# Patient Record
Sex: MALE | Race: OTHER | HISPANIC OR LATINO | Employment: FULL TIME | URBAN - METROPOLITAN AREA
[De-identification: names, ages, dates, MRNs, and addresses within clinical notes are randomized per-mention and may not be internally consistent; named-entity substitution may affect disease eponyms.]

---

## 2018-12-24 ENCOUNTER — ANESTHESIA EVENT (OUTPATIENT)
Dept: PERIOP | Facility: HOSPITAL | Age: 33
End: 2018-12-24
Payer: OTHER MISCELLANEOUS

## 2018-12-24 RX ORDER — SODIUM CHLORIDE 9 MG/ML
125 INJECTION, SOLUTION INTRAVENOUS CONTINUOUS
Status: CANCELLED | OUTPATIENT
Start: 2019-01-10

## 2018-12-26 ENCOUNTER — APPOINTMENT (OUTPATIENT)
Dept: LAB | Facility: HOSPITAL | Age: 33
End: 2018-12-26
Attending: ORTHOPAEDIC SURGERY
Payer: OTHER MISCELLANEOUS

## 2018-12-26 ENCOUNTER — TRANSCRIBE ORDERS (OUTPATIENT)
Dept: ADMINISTRATIVE | Facility: HOSPITAL | Age: 33
End: 2018-12-26

## 2018-12-26 ENCOUNTER — APPOINTMENT (OUTPATIENT)
Dept: PREADMISSION TESTING | Facility: HOSPITAL | Age: 33
End: 2018-12-26
Payer: OTHER MISCELLANEOUS

## 2018-12-26 DIAGNOSIS — M48.062 PSEUDOCLAUDICATION SYNDROME: ICD-10-CM

## 2018-12-26 DIAGNOSIS — M48.062 PSEUDOCLAUDICATION SYNDROME: Primary | ICD-10-CM

## 2018-12-26 LAB
ALBUMIN SERPL BCP-MCNC: 3.6 G/DL (ref 3.5–5)
ALP SERPL-CCNC: 109 U/L (ref 46–116)
ALT SERPL W P-5'-P-CCNC: 52 U/L (ref 12–78)
ANION GAP SERPL CALCULATED.3IONS-SCNC: 10 MMOL/L (ref 4–13)
AST SERPL W P-5'-P-CCNC: 24 U/L (ref 5–45)
BACTERIA UR QL AUTO: ABNORMAL /HPF
BASOPHILS # BLD AUTO: 0.06 THOUSANDS/ΜL (ref 0–0.1)
BASOPHILS NFR BLD AUTO: 1 % (ref 0–1)
BILIRUB SERPL-MCNC: 0.43 MG/DL (ref 0.2–1)
BILIRUB UR QL STRIP: NEGATIVE
BUN SERPL-MCNC: 18 MG/DL (ref 5–25)
CALCIUM SERPL-MCNC: 8.8 MG/DL (ref 8.3–10.1)
CHLORIDE SERPL-SCNC: 103 MMOL/L (ref 100–108)
CLARITY UR: ABNORMAL
CO2 SERPL-SCNC: 25 MMOL/L (ref 21–32)
COLOR UR: YELLOW
CREAT SERPL-MCNC: 1.1 MG/DL (ref 0.6–1.3)
EOSINOPHIL # BLD AUTO: 0.36 THOUSAND/ΜL (ref 0–0.61)
EOSINOPHIL NFR BLD AUTO: 4 % (ref 0–6)
ERYTHROCYTE [DISTWIDTH] IN BLOOD BY AUTOMATED COUNT: 12.3 % (ref 11.6–15.1)
GFR SERPL CREATININE-BSD FRML MDRD: 87 ML/MIN/1.73SQ M
GLUCOSE SERPL-MCNC: 102 MG/DL (ref 65–140)
GLUCOSE UR STRIP-MCNC: NEGATIVE MG/DL
HCT VFR BLD AUTO: 48.3 % (ref 36.5–49.3)
HGB BLD-MCNC: 16.1 G/DL (ref 12–17)
HGB UR QL STRIP.AUTO: ABNORMAL
IMM GRANULOCYTES # BLD AUTO: 0.03 THOUSAND/UL (ref 0–0.2)
IMM GRANULOCYTES NFR BLD AUTO: 0 % (ref 0–2)
KETONES UR STRIP-MCNC: NEGATIVE MG/DL
LEUKOCYTE ESTERASE UR QL STRIP: NEGATIVE
LYMPHOCYTES # BLD AUTO: 3.39 THOUSANDS/ΜL (ref 0.6–4.47)
LYMPHOCYTES NFR BLD AUTO: 38 % (ref 14–44)
MCH RBC QN AUTO: 29.8 PG (ref 26.8–34.3)
MCHC RBC AUTO-ENTMCNC: 33.3 G/DL (ref 31.4–37.4)
MCV RBC AUTO: 89 FL (ref 82–98)
MONOCYTES # BLD AUTO: 0.6 THOUSAND/ΜL (ref 0.17–1.22)
MONOCYTES NFR BLD AUTO: 7 % (ref 4–12)
NEUTROPHILS # BLD AUTO: 4.4 THOUSANDS/ΜL (ref 1.85–7.62)
NEUTS SEG NFR BLD AUTO: 50 % (ref 43–75)
NITRITE UR QL STRIP: NEGATIVE
NON-SQ EPI CELLS URNS QL MICRO: ABNORMAL /HPF
NRBC BLD AUTO-RTO: 0 /100 WBCS
PH UR STRIP.AUTO: 6.5 [PH] (ref 4.5–8)
PLATELET # BLD AUTO: 275 THOUSANDS/UL (ref 149–390)
PMV BLD AUTO: 10.3 FL (ref 8.9–12.7)
POTASSIUM SERPL-SCNC: 3.9 MMOL/L (ref 3.5–5.3)
PROT SERPL-MCNC: 7.7 G/DL (ref 6.4–8.2)
PROT UR STRIP-MCNC: NEGATIVE MG/DL
RBC # BLD AUTO: 5.4 MILLION/UL (ref 3.88–5.62)
RBC #/AREA URNS AUTO: ABNORMAL /HPF
SODIUM SERPL-SCNC: 138 MMOL/L (ref 136–145)
SP GR UR STRIP.AUTO: >=1.03 (ref 1–1.03)
UROBILINOGEN UR QL STRIP.AUTO: 0.2 E.U./DL
WBC # BLD AUTO: 8.84 THOUSAND/UL (ref 4.31–10.16)
WBC #/AREA URNS AUTO: ABNORMAL /HPF

## 2018-12-26 PROCEDURE — 80053 COMPREHEN METABOLIC PANEL: CPT

## 2018-12-26 PROCEDURE — 86803 HEPATITIS C AB TEST: CPT

## 2018-12-26 PROCEDURE — 85025 COMPLETE CBC W/AUTO DIFF WBC: CPT

## 2018-12-26 PROCEDURE — 81001 URINALYSIS AUTO W/SCOPE: CPT | Performed by: ORTHOPAEDIC SURGERY

## 2018-12-26 PROCEDURE — 87081 CULTURE SCREEN ONLY: CPT

## 2018-12-26 PROCEDURE — 36415 COLL VENOUS BLD VENIPUNCTURE: CPT

## 2018-12-26 RX ORDER — MELOXICAM 7.5 MG/1
TABLET ORAL
COMMUNITY

## 2018-12-26 NOTE — PRE-PROCEDURE INSTRUCTIONS
Pre-Surgery Instructions:   Medication Instructions    meloxicam (MOBIC) 7 5 mg tablet Patient was instructed by Physician and understands  Seen by Dr Daniel Pastor  Instructed has no medications to be taken morning of surgery  Stop NSAIDs and aspirin 7 days before surgery  Instructed re chlorhexidine showers per hospital policy

## 2018-12-26 NOTE — ANESTHESIA PREPROCEDURE EVALUATION
Review of Systems/Medical History  Patient summary reviewed  Chart reviewed  No history of anesthetic complications     Cardiovascular  Negative cardio ROS    Pulmonary  Negative pulmonary ROS        GI/Hepatic  Negative GI/hepatic ROS          Negative  ROS        Endo/Other    Obesity    GYN  Negative gynecology ROS          Hematology   Musculoskeletal  Negative musculoskeletal ROS Back pain , lumbar pain,        Neurology  Negative neurology ROS      Psychology   Negative psychology ROS              Physical Exam    Airway    Mallampati score: II  TM Distance: >3 FB  Neck ROM: full     Dental   No notable dental hx     Cardiovascular  Comment: Negative ROS, Rhythm: regular, Rate: normal, Cardiovascular exam normal    Pulmonary  Pulmonary exam normal Breath sounds clear to auscultation,     Other Findings        Anesthesia Plan  ASA Score- 2     Anesthesia Type- general with ASA Monitors  Additional Monitors:   Airway Plan: ETT  Plan Factors-Patient not instructed to abstain from smoking on day of procedure  Patient did not smoke on day of surgery  Induction- intravenous  Postoperative Plan-     Informed Consent- Anesthetic plan and risks discussed with patient

## 2018-12-27 LAB
HCV AB SER QL: NORMAL
MRSA NOSE QL CULT: NORMAL

## 2019-01-10 ENCOUNTER — ANESTHESIA (OUTPATIENT)
Dept: PERIOP | Facility: HOSPITAL | Age: 34
End: 2019-01-10
Payer: OTHER MISCELLANEOUS

## 2019-01-10 ENCOUNTER — HOSPITAL ENCOUNTER (OUTPATIENT)
Facility: HOSPITAL | Age: 34
Setting detail: OUTPATIENT SURGERY
Discharge: HOME | End: 2019-01-10
Attending: ORTHOPAEDIC SURGERY | Admitting: ORTHOPAEDIC SURGERY
Payer: OTHER MISCELLANEOUS

## 2019-01-10 ENCOUNTER — HOSPITAL ENCOUNTER (OUTPATIENT)
Dept: RADIOLOGY | Facility: HOSPITAL | Age: 34
Setting detail: OUTPATIENT SURGERY
Discharge: HOME | End: 2019-01-10
Payer: OTHER MISCELLANEOUS

## 2019-01-10 VITALS
WEIGHT: 210 LBS | HEART RATE: 80 BPM | RESPIRATION RATE: 18 BRPM | DIASTOLIC BLOOD PRESSURE: 72 MMHG | OXYGEN SATURATION: 96 % | SYSTOLIC BLOOD PRESSURE: 119 MMHG | HEIGHT: 66 IN | TEMPERATURE: 98.1 F | BODY MASS INDEX: 33.75 KG/M2

## 2019-01-10 DIAGNOSIS — M48.062 SPINAL STENOSIS OF LUMBAR REGION WITH NEUROGENIC CLAUDICATION: ICD-10-CM

## 2019-01-10 DIAGNOSIS — M51.26 HERNIATION OF NUCLEUS PULPOSUS, LUMBAR: Primary | ICD-10-CM

## 2019-01-10 DIAGNOSIS — M48.061 SPINAL STENOSIS OF LUMBAR REGION, UNSPECIFIED WHETHER NEUROGENIC CLAUDICATION PRESENT: ICD-10-CM

## 2019-01-10 LAB
ABO GROUP BLD: NORMAL
BLD GP AB SCN SERPL QL: NEGATIVE
GLUCOSE SERPL-MCNC: 91 MG/DL (ref 65–140)
RH BLD: POSITIVE
SPECIMEN EXPIRATION DATE: NORMAL

## 2019-01-10 PROCEDURE — 86850 RBC ANTIBODY SCREEN: CPT | Performed by: PHYSICIAN ASSISTANT

## 2019-01-10 PROCEDURE — 86901 BLOOD TYPING SEROLOGIC RH(D): CPT | Performed by: PHYSICIAN ASSISTANT

## 2019-01-10 PROCEDURE — 86900 BLOOD TYPING SEROLOGIC ABO: CPT | Performed by: PHYSICIAN ASSISTANT

## 2019-01-10 PROCEDURE — 82948 REAGENT STRIP/BLOOD GLUCOSE: CPT

## 2019-01-10 PROCEDURE — 97530 THERAPEUTIC ACTIVITIES: CPT

## 2019-01-10 PROCEDURE — 97760 ORTHOTIC MGMT&TRAING 1ST ENC: CPT

## 2019-01-10 PROCEDURE — 72020 X-RAY EXAM OF SPINE 1 VIEW: CPT

## 2019-01-10 RX ORDER — OXYCODONE HYDROCHLORIDE AND ACETAMINOPHEN 5; 325 MG/1; MG/1
1 TABLET ORAL EVERY 4 HOURS PRN
Qty: 60 TABLET | Refills: 0 | Status: SHIPPED | OUTPATIENT
Start: 2019-01-10 | End: 2019-01-20

## 2019-01-10 RX ORDER — ONDANSETRON 2 MG/ML
INJECTION INTRAMUSCULAR; INTRAVENOUS AS NEEDED
Status: DISCONTINUED | OUTPATIENT
Start: 2019-01-10 | End: 2019-01-10 | Stop reason: SURG

## 2019-01-10 RX ORDER — NEOSTIGMINE METHYLSULFATE 1 MG/ML
INJECTION INTRAVENOUS AS NEEDED
Status: DISCONTINUED | OUTPATIENT
Start: 2019-01-10 | End: 2019-01-10 | Stop reason: SURG

## 2019-01-10 RX ORDER — DEXAMETHASONE SODIUM PHOSPHATE 4 MG/ML
INJECTION, SOLUTION INTRA-ARTICULAR; INTRALESIONAL; INTRAMUSCULAR; INTRAVENOUS; SOFT TISSUE AS NEEDED
Status: DISCONTINUED | OUTPATIENT
Start: 2019-01-10 | End: 2019-01-10 | Stop reason: SURG

## 2019-01-10 RX ORDER — GLYCOPYRROLATE 0.2 MG/ML
INJECTION INTRAMUSCULAR; INTRAVENOUS AS NEEDED
Status: DISCONTINUED | OUTPATIENT
Start: 2019-01-10 | End: 2019-01-10 | Stop reason: SURG

## 2019-01-10 RX ORDER — HYDROMORPHONE HCL/PF 1 MG/ML
0.5 SYRINGE (ML) INJECTION
Status: DISCONTINUED | OUTPATIENT
Start: 2019-01-10 | End: 2019-01-10 | Stop reason: HOSPADM

## 2019-01-10 RX ORDER — ROCURONIUM BROMIDE 10 MG/ML
INJECTION, SOLUTION INTRAVENOUS AS NEEDED
Status: DISCONTINUED | OUTPATIENT
Start: 2019-01-10 | End: 2019-01-10 | Stop reason: SURG

## 2019-01-10 RX ORDER — BUPIVACAINE HYDROCHLORIDE AND EPINEPHRINE 5; 5 MG/ML; UG/ML
INJECTION, SOLUTION EPIDURAL; INTRACAUDAL; PERINEURAL AS NEEDED
Status: DISCONTINUED | OUTPATIENT
Start: 2019-01-10 | End: 2019-01-10 | Stop reason: HOSPADM

## 2019-01-10 RX ORDER — METHOCARBAMOL 750 MG/1
750 TABLET, FILM COATED ORAL EVERY 6 HOURS PRN
Qty: 80 TABLET | Refills: 0 | Status: SHIPPED | OUTPATIENT
Start: 2019-01-10 | End: 2019-02-09

## 2019-01-10 RX ORDER — ESMOLOL HYDROCHLORIDE 10 MG/ML
INJECTION INTRAVENOUS AS NEEDED
Status: DISCONTINUED | OUTPATIENT
Start: 2019-01-10 | End: 2019-01-10 | Stop reason: SURG

## 2019-01-10 RX ORDER — HYDROMORPHONE HYDROCHLORIDE 2 MG/ML
INJECTION, SOLUTION INTRAMUSCULAR; INTRAVENOUS; SUBCUTANEOUS AS NEEDED
Status: DISCONTINUED | OUTPATIENT
Start: 2019-01-10 | End: 2019-01-10 | Stop reason: SURG

## 2019-01-10 RX ORDER — CEFAZOLIN SODIUM 2 G/50ML
2000 SOLUTION INTRAVENOUS
Status: DISCONTINUED | OUTPATIENT
Start: 2019-01-10 | End: 2019-01-10 | Stop reason: HOSPADM

## 2019-01-10 RX ORDER — FENTANYL CITRATE 50 UG/ML
INJECTION, SOLUTION INTRAMUSCULAR; INTRAVENOUS AS NEEDED
Status: DISCONTINUED | OUTPATIENT
Start: 2019-01-10 | End: 2019-01-10 | Stop reason: SURG

## 2019-01-10 RX ORDER — ACETAMINOPHEN 325 MG/1
975 TABLET ORAL
Status: DISCONTINUED | OUTPATIENT
Start: 2019-01-10 | End: 2019-01-10 | Stop reason: HOSPADM

## 2019-01-10 RX ORDER — PROPOFOL 10 MG/ML
INJECTION, EMULSION INTRAVENOUS AS NEEDED
Status: DISCONTINUED | OUTPATIENT
Start: 2019-01-10 | End: 2019-01-10 | Stop reason: SURG

## 2019-01-10 RX ORDER — HYDROCODONE BITARTRATE AND ACETAMINOPHEN 5; 325 MG/1; MG/1
2 TABLET ORAL EVERY 4 HOURS PRN
Status: DISCONTINUED | OUTPATIENT
Start: 2019-01-10 | End: 2019-01-10 | Stop reason: HOSPADM

## 2019-01-10 RX ORDER — KETOROLAC TROMETHAMINE 30 MG/ML
INJECTION, SOLUTION INTRAMUSCULAR; INTRAVENOUS AS NEEDED
Status: DISCONTINUED | OUTPATIENT
Start: 2019-01-10 | End: 2019-01-10 | Stop reason: SURG

## 2019-01-10 RX ORDER — ONDANSETRON 2 MG/ML
4 INJECTION INTRAMUSCULAR; INTRAVENOUS ONCE AS NEEDED
Status: DISCONTINUED | OUTPATIENT
Start: 2019-01-10 | End: 2019-01-10 | Stop reason: HOSPADM

## 2019-01-10 RX ORDER — CELECOXIB 200 MG/1
200 CAPSULE ORAL
Status: DISCONTINUED | OUTPATIENT
Start: 2019-01-10 | End: 2019-01-10 | Stop reason: HOSPADM

## 2019-01-10 RX ORDER — SODIUM CHLORIDE 9 MG/ML
125 INJECTION, SOLUTION INTRAVENOUS CONTINUOUS
Status: DISCONTINUED | OUTPATIENT
Start: 2019-01-10 | End: 2019-01-10 | Stop reason: HOSPADM

## 2019-01-10 RX ORDER — SODIUM CHLORIDE, SODIUM LACTATE, POTASSIUM CHLORIDE, CALCIUM CHLORIDE 600; 310; 30; 20 MG/100ML; MG/100ML; MG/100ML; MG/100ML
100 INJECTION, SOLUTION INTRAVENOUS CONTINUOUS
Status: DISCONTINUED | OUTPATIENT
Start: 2019-01-10 | End: 2019-01-10 | Stop reason: HOSPADM

## 2019-01-10 RX ORDER — KETAMINE HYDROCHLORIDE 50 MG/ML
INJECTION, SOLUTION, CONCENTRATE INTRAMUSCULAR; INTRAVENOUS AS NEEDED
Status: DISCONTINUED | OUTPATIENT
Start: 2019-01-10 | End: 2019-01-10 | Stop reason: SURG

## 2019-01-10 RX ORDER — MIDAZOLAM HYDROCHLORIDE 1 MG/ML
INJECTION INTRAMUSCULAR; INTRAVENOUS AS NEEDED
Status: DISCONTINUED | OUTPATIENT
Start: 2019-01-10 | End: 2019-01-10 | Stop reason: SURG

## 2019-01-10 RX ADMIN — KETAMINE HYDROCHLORIDE 20 MG: 50 INJECTION INTRAMUSCULAR; INTRAVENOUS at 10:03

## 2019-01-10 RX ADMIN — ACETAMINOPHEN 975 MG: 325 TABLET, FILM COATED ORAL at 07:43

## 2019-01-10 RX ADMIN — CELECOXIB 200 MG: 200 CAPSULE ORAL at 07:44

## 2019-01-10 RX ADMIN — NEOSTIGMINE METHYLSULFATE 3 MG: 1 INJECTION INTRAVENOUS at 11:29

## 2019-01-10 RX ADMIN — FENTANYL CITRATE 50 MCG: 50 INJECTION, SOLUTION INTRAMUSCULAR; INTRAVENOUS at 10:25

## 2019-01-10 RX ADMIN — SODIUM CHLORIDE 125 ML/HR: 0.9 INJECTION, SOLUTION INTRAVENOUS at 08:01

## 2019-01-10 RX ADMIN — SODIUM CHLORIDE: 0.9 INJECTION, SOLUTION INTRAVENOUS at 10:25

## 2019-01-10 RX ADMIN — KETAMINE HYDROCHLORIDE 10 MG: 50 INJECTION INTRAMUSCULAR; INTRAVENOUS at 10:25

## 2019-01-10 RX ADMIN — FENTANYL CITRATE 100 MCG: 50 INJECTION, SOLUTION INTRAMUSCULAR; INTRAVENOUS at 09:57

## 2019-01-10 RX ADMIN — CEFAZOLIN SODIUM 2000 MG: 2 SOLUTION INTRAVENOUS at 10:03

## 2019-01-10 RX ADMIN — KETAMINE HYDROCHLORIDE 10 MG: 50 INJECTION INTRAMUSCULAR; INTRAVENOUS at 10:12

## 2019-01-10 RX ADMIN — ROCURONIUM BROMIDE 40 MG: 10 INJECTION INTRAVENOUS at 09:57

## 2019-01-10 RX ADMIN — GLYCOPYRROLATE 0.4 MG: 0.2 INJECTION, SOLUTION INTRAMUSCULAR; INTRAVENOUS at 11:29

## 2019-01-10 RX ADMIN — ONDANSETRON 4 MG: 2 INJECTION INTRAMUSCULAR; INTRAVENOUS at 10:03

## 2019-01-10 RX ADMIN — KETAMINE HYDROCHLORIDE 10 MG: 50 INJECTION INTRAMUSCULAR; INTRAVENOUS at 10:18

## 2019-01-10 RX ADMIN — ESMOLOL HYDROCHLORIDE 10 MG: 10 INJECTION, SOLUTION INTRAVENOUS at 10:05

## 2019-01-10 RX ADMIN — KETOROLAC TROMETHAMINE 30 MG: 30 INJECTION, SOLUTION INTRAMUSCULAR at 10:30

## 2019-01-10 RX ADMIN — MIDAZOLAM 2 MG: 1 INJECTION INTRAMUSCULAR; INTRAVENOUS at 09:50

## 2019-01-10 RX ADMIN — PROPOFOL 50 MG: 10 INJECTION, EMULSION INTRAVENOUS at 09:57

## 2019-01-10 RX ADMIN — DEXAMETHASONE SODIUM PHOSPHATE 8 MG: 4 INJECTION, SOLUTION INTRAMUSCULAR; INTRAVENOUS at 10:03

## 2019-01-10 RX ADMIN — HYDROMORPHONE HYDROCHLORIDE 0.5 MG: 2 INJECTION, SOLUTION INTRAMUSCULAR; INTRAVENOUS; SUBCUTANEOUS at 10:38

## 2019-01-10 RX ADMIN — FENTANYL CITRATE 50 MCG: 50 INJECTION, SOLUTION INTRAMUSCULAR; INTRAVENOUS at 10:11

## 2019-01-10 NOTE — PHYSICAL THERAPY NOTE
Orthotic Note            Date: 1/10/2019      Patient Name: Concetta Moreno            Reason for Consult:  LSO fitting & training      Recommendations:  Pt 37 y o male s/p L4/5, L5/S1 HEMILAMINOTOMY; MEDIAL FACETECTOMY AND FORAMINOTOMY 2* to spinal stenosis of lumbar region w/ neurogenic claudication  PT consulted for LSO fitting & training  Applied LSO while sitting at EOB  LSO adjusted for appropriate fit  Proper fit achieved  Pt & wife instructed on LSO application w/ good understanding & return demo  Pt also instructed on back precautions & safety w/ good understanding  Pt require minAx1 for bed mobility & transfers 2* to pain & dizziness  Unable to assess amb 2* to inc dizziness upon standing hence assisted back in bed  BP monitored by RN  No LOC noted  Will D/C PT  RN to further mobilized pt to assess mobility tolerance  RN notified   Efrain Ewing PT

## 2019-01-10 NOTE — DISCHARGE INSTRUCTIONS
Please see printed instructions from [address] los puntos de sutura absorbibles   LO QUE NECESITA SABER:   ¿Qué son los puntos de sutura absorbibles? Los puntos de sutura o suturas absorbibles se usan para cerrar incisiones o heridas  Estos puntos de sutura son absorbidos por el cuerpo, o se caen por sí solos en varios días o semanas  No necesitan ser removidos  ¿Cómo puedo cuidar mis puntos de sutura absorbibles y mi herida? · Proteja garduno herida contra joy lesión adicional   Use ropa protectora sobre la herida y protéjala chava solar  No ponga presión sobre garduno herida  Gobles podría [place]a [place] y aumentar el riesgo de joy infección  · Eleve el área de la herida  Mantenga la herida por encima del nivel del corazón tan a menudo mack pueda  Gobles va a disminuir inflamación y el dolor  Si es posible, apoye el área de garduno herida sobre almohadas o mantas para mantenerla elevada cómodamente  · Brenda Motts y [place] garduno herida con un vendaje  Lave cuidadosamente la herida con agua y [place]  Seque con cuidado el área y coloque vendas nuevas y limpias mack se le indique  Cambie linh vendajes cuando se mojen o ensucien  · Rosita joy ducha en vez de bañarse  Espere de 12 a 24 horas después de recibir linh puntos de sutura para bañarse  No tome lili de huyen ni nade  ¿Cuándo delfina buscar atención inmediata? · Garduno herida se abre  · Usted tiene líneas vasquez alrededor de garduno herida  · Usted tiene inflamación o dolor en los ganglios linfáticos  · La silvio empapa el vendaje  ¿Cuándo delfina comunicarme con mi médico?   · Usted tiene signos de Daryle Riggins infección, mack más enrojecimiento, dolor, inflamación o pus  · Usted tiene fiebre  · Linh puntos de sutura no se absorben cuando garduno médico le indicó que deberían Emmalena  · Usted tiene preguntas o inquietudes acerca de garduno condición o cuidado  ACUERDOS SOBRE GARDUNO CUIDADO:   Usted tiene el derecho de ayudar a planear garduno cuidado   Aprenda todo lo que pueda sobre patton condición y mack darle [address]  Discuta linh opciones de tratamiento con linh médicos para decidir el cuidado que usted desea recibir  Usted siempre tiene el derecho de rechazar el tratamiento  Esta información es sólo para uso en educación  Patton intención no es darle un consejo médico sobre enfermedades o tratamientos  Colsulte con patton [place] farmacéutico antes de seguir cualquier régimen médico para saber si es seguro y efectivo para usted  © 2017 [address] Information is for End User's use only and may not be sold, redistributed or otherwise used for commercial purposes  All illustrations and images included in CareNotes® are the copyrighted property of A D A M , Inc  or Marcos Sow  Laminectomía   LO QUE NECESITA SABER:   [place] laminectomía es [place] para extraer los lorelei óseos (lámina) de joy o más vértebras de patton columna  Esta cirugía puede ayudar a aliviar la presión [employer] médula wise o nervios  INSTRUCCIONES SOBRE EL ADIEL HOSPITALARIA:   Busque atención médica de inmediato o llame al 911 si:   · Patton brazo o pierna se siente caliente, sensible y adolorida  Se podría kelsea inflamado y lindo  · Usted se siente mareada, le hace falta el aire y tiene dolor de [place]  · Usted expectora silvio  · Usted no puede sentir ni  linh piernas  · Usted no puede controlar cuándo [place] o tiene joy evacuación intestinal     · Usted nota joy secreción tricia en patton piel o vendaje que sale de patton incisión  Pregúntele a patton Ardie Isma vitaminas y minerales son adecuados para usted  · Hay joy protuberancia en el área de patton incisión  · Usted tiene colleen de archie regulares  · Linh puntos de sutura o grapas están inflamados , rojos o [employer] pus  · Se [address] grapas  · La silvio empapa el vendaje  · Patton dolor empeora aún después de isa medicamento para el dolor      · Usted se siente débil, entumecido o tiene dolor en hernandez espalda, glúteos o piernas  · Usted tiene fiebre  · Usted tiene preguntas o inquietudes acerca de hernandez condición o cuidado  Medicamentos:   · Los analgésicos  podrían ser administrados  Pregunte cómo isa estos medicamentos de joy forma cornejo  · [place] sally medicamentos mack se le haya indicado  Consulte con hernandez médico si usted levon que hernandez medicamento no le está ayudando o si presenta efectos secundarios  Infórmele si es alérgico a cualquier medicamento  Mantenga joy lista actualizada de los [place], las vitaminas y los productos herbales que amaya  Incluya los siguientes datos de los medicamentos: cantidad, frecuencia y motivo de administración  Traiga con usted la lista o los envases de la píldoras a sally citas de seguimiento  Lleve la lista de los medicamentos con usted en joseph de joy emergencia  Acuda a sally consultas de control con hernandez médico según le indicaron  Anote sally preguntas para que se acuerde de hacerlas avery sally visitas  Fisioterapia:  Un fisioterapeuta le enseñará ejercicios para fortalecer los músculos de la espalda y aliviar el dolor  También, puede enseñarle técnicas para doblarse, pararse, sentarse y levantar objetos sin lastimarse  Actividad:  Es probable que [place] descansar más después de la [place]  Empiece a hacer un poco más día a día  Descanse cuando usted sienta que es necesario  Avery 1 a 2 semanas, limite las veces que sube y baja escaleras a joy amanda vez al día si es posible  Pregunte cuándo puede volver a sally [employer]  Pautas para el movimiento:   · No levante objetos pesados , mack alimentos para la despensa o cestas de ropa avery las primeras 2 semanas  No levante nada por encima de hernandez archie  · No doble las caderas  Doble las rodillas y mantenga la espalda recta al recoger cosas del suelo  · No tuerza la cintura  · Duerma en un colchón firme    Cuando usted se acuesta de espaldas, coloque 2 o 3 almohadas debajo de sally rodillas y la parte inferior de sally piernas para elevarlas  Al acostarse de lado, doble sally rodillas y use joy almohada pequeña por debajo de patton archie y beulah  [place] disminuirá la tensión en sally hombros, beulah y brazos  No  se acueste boca abajo  · No se siente por más de 15 a 20 minutos a la [employer] 3 primeras semanas después de la [facility]  Si[place]ntese en joy silla firme con la espalda derecha  Mantenga sally pies sobre un banco y sally rodillas dobladas y ligeramente más altas que sally caderas  Cuidado de la herida:  Lave cuidadosamente la herida con agua y [place]  Seque el área y póngale vendajes nuevos y limpios mack le indicaron  Cambie sally vendajes cuando se mojen o ensucien  Calor:  Aplique calor sobe patton herida de 20 a 30 minutos cada 2 horas por los días que se le indique  El calor ayuda a disminuir el dolor y los espasmos musculares  © 2017 [address] Information is for End User's use only and may not be sold, redistributed or otherwise used for commercial purposes  All illustrations and images included in CareNotes® are the copyrighted property of [employer]  or Marcos Sow  Esta información es sólo para uso en educación  Patton intención no es darle un consejo médico sobre enfermedades o tratamientos  Colsulte con patton Nitin Loida farmacéutico antes de seguir cualquier régimen médico para saber si es seguro y efectivo para usted  Metocarbamol (Por la boca)   Sirve para tratar el dolor y los espasmos musculares  Domingo(s) : Robaxin, Robaxin-750   Existen muchas otras marcas de Chanel  Howie medicamento no debe ser usado cuando:   No use howie medicamento si alguna vez sheriff tenido Suzanne Shillings reacción alérgica al [employer] o a medicamentos relacionados mack [place]® o [place]  Forma de usar howie medicamento:   Tableta  · El médico le dirá cuánto medicamento isa, y la frecuencia con que debe hacerlo    · No tome joy dosis mayor ni con [place] frecuencia que lo ordenado por hernandez médico   Si joy dosis es olvidada:   · [place] la dosis [place] lo más pronto posible, si no ha pasado más de joy hora del horario habitual  Si sheriff pasado más de [place], sáltese la dosis [place] y regrese a hernandez horario regular  · No use dos dosis al [facility] MIRAGE  Forma de guardar y botar howie medicamento:   · Guarde a temperatura ambiente, lejos del calor, la humedad y la dominga directa  · Guarde todos los medicamentos fuera del alcance de los niños  Medicamentos y [employer] que debe evitar:   Consulte con hernandez médico o farmacéutico antes de usar cualquier medicamento, incluyendo los que compra sin receta médica, las vitaminas y los productos herbales  · No abdi alcohol mientras use howie medicamento  · Informe al médico si usted está usando cualquier medicamento que pueda causarle sueño mack las píldoras para dormir, sedantes, antidepresivos, medicamentos para el resfriado, alergia o dolor  Precauciones earl el uso de howie medicamento:   · Informe a hernandez medico si esta embarazada o dando de lactar  · [facility] puede causar sueño o mareo  Tenga cuidado si maneja un automóvil o al [employer]  · [facility] puede produce que hernandez orina se torne [place], aditi o liu  [place] no es razón para preocuparse  Efectos secundarios que pueden presentarse earl el uso de howie medicamento:   Consulte inmediatamente con el médico si nota cualquiera de estos efectos secundarios:  · Dificultad para respirar  · Sarpullido a la piel, ronchas o picazón  · Dificultad para hablar con claridad o sueño severo  · Fiebre o escalofríos  Consulte con el médico si nota los siguientes efectos secundarios menos graves:   · Dolor de archie  · Sueño o Sharl Shirts  · Náuseas  · Pérdida del apetito o sabor metálico  · Nariz congestionada  Consulte con el médico si nota otros efectos secundarios que levon son causados por howie medicamento     Llame a hernandez médico para consultarle Marko Rosa secundarios  Usted puede notificar sally efectos secundarios al FDA al [phone]  © 2017 [address] Information is for End User's use only and may not be sold, redistributed or otherwise used for commercial purposes  Esta información es sólo para uso en educación  Patton intención no es darle un consejo médico sobre enfermedades o tratamientos  Colsulte con patton Na [place] farmacéutico antes de seguir cualquier régimen médico para saber si es seguro y efectivo para usted  Oxicodona/Acetaminofén (Por la boca)   Se [place] para tratar el dolor de moderado a moderadamente intenso  Howie medicamento es un narcótico para el dolor  Domingo(s) : Endocet, Percocet, Primlev, Xartemis XR   Existen muchas otras marcas de [employer]  Howie medicamento no debe ser usado cuando:   Howie medicamento no es adecuado para todas las personas  No lo use si usted alguna vez tuvo trini reacción alérgica al acetaminofeno o la oxicodona, o si tiene problemas respiratorios graves o [address] usar howie medicamento:   Caitlin Galeana Muckle de liberación prolongada  · Patton médico le indicara cuanto medicamento necesita usar  No use más medicamento de lo indicado  · Trini sobredosis puede ser Kyle García  Siga las instrucciones con cuidado para no isa [facility] cantidad del medicamento de trini amanda vez  · Solución oral: Mida el líquido oral con leanna Suazo para uso oral o taza especialmente marcadas para medir medicamentos  · [place] la tableta de liberación prolongada entera  No triture, rompa o mastique  No chupe o moje la tableta antes de colocarla en patton boca  No administre howie medicamento a través de un tubo alimenticio  · [employer] debe venir con Gabriella Normanía del medicamento  [place] trini copia con patton farmacéutico en joseph de no tener la guía    · Si olvida trini dosis:  Si se le olvida isa rtini dosis de [employer], omita la dosis [place] y vuelva al horario regular de dosificación  No duplique la dosis  · Guarde el medicamento en un recipiente cerrado a temperatura ambiente y alejado del calor, la humedad y la dominga directa  Consulte con hernandez farmacéutico sobre la mejor forma de botar el medicamento que no haya usado  Medicamentos y [employer] que debe evitar:   Consulte con hernandez médico o farmacéutico antes de usar cualquier medicamento, incluyendo los que compra sin receta médica, las vitaminas y los productos herbales  · No utilice Xartemis XR si usted [place] un inhibidor de la monoaminooxidasa (IMAO) o si lo ha [employer] últimos 14 días  · Algunos medicamentos pueden afectar la función de Inspro  Informe a hernandez médico si está usando cualquiera de los siguientes:   ¨ [place], eritromicina, ketoconazol, lamotrigina, mirtazapina, naltrexona, fenitoína, propranolol, rifampina, ritonavir, tramadol, trazodona o zidovudina  ¨ Las pastillas anticonceptivas  ¨ Diurético  ¨ Medicamento para tratar la depresión  ¨ Medicamentos con fenotiazina  ¨ Medicamento de triptán para tratar la migraña  · No consuma alcohol mientras esté   El acetaminofeno puede dañar hernandez hígado y el alcohol puede aumentar howie riesgo  Si usted consume 3 bebidas alcohólicas o más cada día, no tome acetaminofén sin consultar a hernandez médico   · Informe a hernandez médico si usted [place] cualquier cosa que le provoca sueño  G[place]alo [place] son medicamentos para alergia o medicamentos narcóticos para el dolor y el alcohol  Informe a hernandez médico si usted está usando buprenorfina, butorfanol, nalbufina, pentazocina, joy benzodiazepina o un relajante muscular    Precauciones earl el uso de howie medicamento:   · Informe a hernandez médico si usted está embarazada o amamantando, o si tiene enfermedad renal, enfermedad hepática, enfermedad cardíaca, presión arterial baja, problemas pulmonares o respiratorios (mack asma, EPOC), problemas de tiroides, enfermedad de [place], problemas en el páncreas o la vesícula, problemas prostáticos, problemas para orinar o trastornos estomacales, o antecedentes de lesión en la archie o daño cerebral, convulsiones o adicción a las drogas o el alcohol  Infórmele al médico si usted es alérgico a la codeína  · Jenae medicamento puede causar los siguientes problemas:  ¨ Mayor riesgo de sobredosis, que puede conducir a la muerte  ¨ La depresión respiratoria (problema respiratorio grave que puede ser mortal)  ¨ Problemas hepáticos  ¨ Reacciones graves en la piel  ¨ Síndrome de la serotonina (cuando se [place] con otros medicamentos)  · Jenae medicamento podría causarle a usted mareos o somnolencia  No maneje ni nestor otra tarea que pueda ser peligrosa hasta que sepa cómo le afecta jenae medicamento  Siéntese o acuéstese si se siente mareado  Póngase de pie con cuidado  · Jenae medicamento contiene acetaminofén  Gretel las etiquetas de todos los demás medicamentos que esté usando para saber si también contienen acetaminofén, o pregunte a hernandez médico o farmacéutico  No use más de 4 gramos (4000 miligramos) en total de acetaminofeno en un día  · Jenae medicamento puede convertirse en un hábito  No use más de la dosis prescrita  Llame a hernandez médico si usted siente que el medicamento no le está funcionando  · No suspenda el uso de jenae medicamento súbitamente  Hernandez médico necesitará disminuir hernandez dosis poco a poco antes de suspender el medicamento por completo  · Puede que jenae medicamento cause infertilidad  Hable con hernandez médico antes de usar jenae medicamento si usted planea tener hijos  · Jenae medicamento puede causar el estreñimiente, especialmente si usted lo [place] earl [employer]  Pregúntele a hernandez médico si usted también debería usar un laxante para prevenir y tratar el estreñimiento  · Guarde todos los medicamentos fuera del alcance de los niños  Nunca comparta sally medicamentos con [employer]    Efectos secundarios que pueden presentarse earl el uso de jenae medicamento:   Consulte inmediatamente con el médico si nota cualquiera de estos efectos secundarios:  · Reacción alérgica: Comezón o ronchas, hinchazón del vasquez o las shirley, hinchazón u hormigueo en la boca o garganta, opresión en el pecho, dificultad para respirar  · Ansiedad, inquietud, ritmo cardíaco rápido, fiebre, espasmos musculares, contracciones involuntarias, diarrea, kelsea o escuchar cosas que no existen  · Ampollas, despellejamiento, sarpullido lindo en la piel  · Piel, labios o uñas azuladas  · [place] oscura o heces pálidas, pérdida de apetito, dolor de BJURHOLM, piel u ojos amarillos  · Debilidad excesiva, respiración superficial, ritmo cardíaco irregular, convulsiones, transpiración, o piel húmeda o fría  · Confusión grave, desvanecimientos, mareos o desmayos  · Estreñimiento grave, náuseas o vómitos  · Dificultad para respirar o respiración lenta  Consulte con el médico si nota los siguientes efectos secundarios menos graves:   · Dolor de archie  · Estreñimiento leve, náuseas o vómitos  · Sueño o somnolencia leves  Consulte con el médico si nota otros efectos secundarios que levon son causados por howie medicamento  Llame a patton médico para consultarle Sol Gardner puede notificar sally efectos secundarios al FDA al [phone]  © 2017 [address] Information is for End User's use only and may not be sold, redistributed or otherwise used for commercial purposes  Esta información es sólo para uso en educación  Patton intención no es darle un consejo médico sobre enfermedades o tratamientos  Colsulte con patton Sandra Lauri farmacéutico antes de seguir cualquier régimen médico para saber si es seguro y efectivo para usted

## 2019-01-10 NOTE — PROGRESS NOTES
H&P from [facility] reviewed      Heart - regular rate and rhythm  Lungs - CTA bilaterally  Abdomen - soft, non-tender, non-distended, normoactive BS

## 2019-01-10 NOTE — OP NOTE
OPERATIVE REPORT  PATIENT NAME: Ezequiel Amezcua    :  1985  MRN: [id number]  Pt Location: [facility] OR ROOM 05    SURGERY DATE: 1/10/2019    Surgeon(s) and Role:     * Angel Mondragon MD - Primary     * Marj Casillas PA-C - Assisting    Preop Diagnosis:  Spinal stenosis of lumbar region with neurogenic claudication [M48 062]    Post-Op Diagnosis Codes:     * Spinal stenosis of lumbar region with neurogenic claudication [M48 062]    Procedure(s) (LRB):  L4/5, L5/S1 HEMILAMI; MEDIAL FACECTECTOMY AND FORAMINOTOMY (Bilateral)    Specimen(s):  * No specimens in log *    Estimated Blood Loss:   Minimal    Drains:  None    Anesthesia Type:   General    Operative Indications:  Spinal stenosis of lumbar region with neurogenic claudication [M95085]  15-year-old gentleman with severe bilateral lateral recess stenosis at L4-5 and L5-S1 with associated neurological claudication and radiculopathy  He had failed conserve measures and opted to pursue surgery fully and sending risk and benefits of procedure  The symptoms started following work-related incident  Operative Findings:  Severe spinal stenosis L4-5 and B2-O4    Complications:   None    Procedure and Technique:  The patient was brought to the operating room and following identification, underwent general endotracheal anesthesia  Antibiotic prophylaxis was administered  He was then positioned prone on a Abiodun table and all bony promises were padded  The position of disc spaces were then marked using fluoroscopy  A vertical incision was then made in line spinous process of L4 through S1  Subcu tissue was then divided using electrocautery  The paraspinal muscles were then released subperiosteally and retracted facet capsule  Hemostasis was achieved  Intraoperative position was then confirmed under fluoroscopy  Attention was then directed to decompression    L5-S1 was treated 1st   Bilateral hemilaminotomy was performed by thinning out the inferior aspect of L5 and superior aspect of S1 kristen lamina  Central decompression was then performed and ligamentum flavum was resected  This was followed by medial facetectomy and foraminotomy was performed in similar fashion using high-speed bur in addition to smaller sizes of Kerrisons  Attention was then directed L4-5 level  Bilateral hemilaminotomy was performed by thinning out the inferior aspect of L4 and superior aspect of L5 kristen lamina  Central decompression was then performed and ligamentum flavum was resected  This was followed by medial facetectomy and foraminotomy was performed in similar fashion using high-speed bur in addition to smaller sizes of Kerrisons  The wound was then thoroughly irrigated  Hemostasis was achieved using combination of bipolar electrocautery and FloSeal   FloSeal was placed in the gutters assist with hemostasis  The paraspinal muscle was then reapproximated  This was followed by closure of fascia, subcutaneous tissue and skin  Subcuticular suture was utilized  Sterile dress was applied and the patient was then extubated and taken to PACU in stable condition  I was personally present throughout the procedure, qualified resident or fellow was not available  Mrs Gala Tucker assistants was essential during every aspect of the procedure including positioning, exposure, decompression and closure      Patient Disposition:  PACU     SIGNATURE: Yulia Maharaj MD  DATE: January 10, 2019  TIME: 4:14 PM

## 2019-05-01 ENCOUNTER — HOSPITAL ENCOUNTER (EMERGENCY)
Facility: HOSPITAL | Age: 34
Discharge: HOME | End: 2019-05-01
Attending: EMERGENCY MEDICINE
Payer: OTHER MISCELLANEOUS

## 2019-05-01 ENCOUNTER — APPOINTMENT (EMERGENCY)
Dept: RADIOLOGY | Facility: HOSPITAL | Age: 34
End: 2019-05-01
Payer: OTHER MISCELLANEOUS

## 2019-05-01 VITALS
BODY MASS INDEX: 33.91 KG/M2 | WEIGHT: 210.1 LBS | SYSTOLIC BLOOD PRESSURE: 133 MMHG | HEART RATE: 79 BPM | TEMPERATURE: 98.4 F | RESPIRATION RATE: 18 BRPM | DIASTOLIC BLOOD PRESSURE: 75 MMHG | OXYGEN SATURATION: 98 %

## 2019-05-01 DIAGNOSIS — M54.50 ACUTE EXACERBATION OF CHRONIC LOW BACK PAIN: Primary | ICD-10-CM

## 2019-05-01 DIAGNOSIS — R29.4 CLICKING OF LEFT HIP: ICD-10-CM

## 2019-05-01 DIAGNOSIS — G89.29 ACUTE EXACERBATION OF CHRONIC LOW BACK PAIN: Primary | ICD-10-CM

## 2019-05-01 PROCEDURE — 99283 EMERGENCY DEPT VISIT LOW MDM: CPT

## 2019-05-01 PROCEDURE — 99283 EMERGENCY DEPT VISIT LOW MDM: CPT | Performed by: PHYSICIAN ASSISTANT

## 2019-05-01 PROCEDURE — 73502 X-RAY EXAM HIP UNI 2-3 VIEWS: CPT

## 2019-05-01 RX ORDER — LIDOCAINE 50 MG/G
1 PATCH TOPICAL DAILY
Qty: 30 PATCH | Refills: 0 | Status: SHIPPED | OUTPATIENT
Start: 2019-05-01

## 2019-05-01 RX ORDER — LIDOCAINE 50 MG/G
1 PATCH TOPICAL ONCE
Status: DISCONTINUED | OUTPATIENT
Start: 2019-05-01 | End: 2019-05-01 | Stop reason: HOSPADM

## 2019-05-01 RX ADMIN — LIDOCAINE 1 PATCH: 50 PATCH TOPICAL at 19:00

## 2019-11-25 VITALS
HEART RATE: 73 BPM | HEIGHT: 66 IN | BODY MASS INDEX: 33.75 KG/M2 | WEIGHT: 210 LBS | DIASTOLIC BLOOD PRESSURE: 85 MMHG | SYSTOLIC BLOOD PRESSURE: 117 MMHG

## 2019-11-25 DIAGNOSIS — M54.42 CHRONIC BILATERAL LOW BACK PAIN WITH LEFT-SIDED SCIATICA: Primary | ICD-10-CM

## 2019-11-25 DIAGNOSIS — G89.29 CHRONIC BILATERAL LOW BACK PAIN WITH LEFT-SIDED SCIATICA: Primary | ICD-10-CM

## 2019-11-25 PROCEDURE — 99213 OFFICE O/P EST LOW 20 MIN: CPT | Performed by: ORTHOPAEDIC SURGERY

## 2019-11-25 NOTE — PROGRESS NOTES
Assessment/Plan:    No problem-specific Assessment & Plan notes found for this encounter  Low back pain with left lateral thigh pain  · S/p L4/L5, L5/S1 hemilaminectomy, 1/10/19 with Dr De La Garza  · Surgery is not currently recommended    · The patient is referred to pain management for consideration of lumbar injections  · Consider L4/L5 and L5/S1 RFA  · Follow up as needed        Problem List Items Addressed This Visit     None      Visit Diagnoses     Chronic bilateral low back pain with left-sided sciatica    -  Primary    Relevant Orders    Ambulatory referral to Pain Management            Subjective:      Patient ID: Megan Chua is a 37 y o  male  HPI   The patient presents for initial evaluation of low back and left leg  He has had symptoms for over two years with an injury in 2017  Today he complains of low back pain with left lateral thigh pain to the knee  He rates his pain at 5/10  The left hip can click  Lifting any item and cold weather aggravates  He will use tylenol and a topical NSAID cream with some relief  He had used medication management at some point with limited relief  He did physical therapy with limited relief  He has history of L4/L5, L5/S1 hemilaminectomy, 1/10/19  The following portions of the patient's history were reviewed and updated as appropriate: allergies, current medications, past family history, past medical history, past social history, past surgical history and problem list     Review of Systems   Constitutional: Negative for chills, fever and unexpected weight change  HENT: Negative for hearing loss, nosebleeds and sore throat  Eyes: Negative for pain, redness and visual disturbance  Respiratory: Negative for cough, shortness of breath and wheezing  Cardiovascular: Negative for chest pain, palpitations and leg swelling  Gastrointestinal: Negative for abdominal pain, nausea and vomiting  Endocrine: Negative for polydipsia and polyuria  Genitourinary: Negative for difficulty urinating and hematuria  Skin: Negative for rash and wound  Psychiatric/Behavioral: Negative for decreased concentration and suicidal ideas  The patient is not nervous/anxious  Objective:      /85   Pulse 73   Ht 5' 6" (1 676 m)   Wt 95 3 kg (210 lb)   BMI 33 89 kg/m²          Physical Exam   Constitutional: He is oriented to person, place, and time  He appears well-developed and well-nourished  HENT:   Right Ear: External ear normal    Left Ear: External ear normal    Nose: Nose normal    Eyes: Conjunctivae are normal    Neck: Normal range of motion  Pulmonary/Chest: Effort normal    Neurological: He is alert and oriented to person, place, and time  Skin: Skin is warm and dry  Psychiatric: He has a normal mood and affect   His behavior is normal  Judgment and thought content normal        Patient ambulates without assistance   Tender to palpation over lumbosacral junction  Modified straight leg raise negative bilaterally  Strength L2-S1 5/5 bilaterally   Sensation L2-S1 intact bilaterally     Imagin19 lumbar MRI:      Scribe Attestation    I,:   Deysi Mclain am acting as a scribe while in the presence of the attending physician :        I,:   Deepthi Ash MD personally performed the services described in this documentation    as scribed in my presence :

## 2024-05-21 ENCOUNTER — APPOINTMENT (OUTPATIENT)
Dept: URGENT CARE | Age: 39
End: 2024-05-21

## (undated) DEVICE — GLOVE INDICATOR PI UNDERGLOVE SZ 8 BLUE

## (undated) DEVICE — INTENDED FOR TISSUE SEPARATION, AND OTHER PROCEDURES THAT REQUIRE A SHARP SURGICAL BLADE TO PUNCTURE OR CUT.: Brand: BARD-PARKER SAFETY BLADES SIZE 10, STERILE

## (undated) DEVICE — INTENDED FOR TISSUE SEPARATION, AND OTHER PROCEDURES THAT REQUIRE A SHARP SURGICAL BLADE TO PUNCTURE OR CUT.: Brand: BARD-PARKER ® CARBON RIB-BACK BLADES

## (undated) DEVICE — CHLORAPREP HI-LITE 26ML ORANGE

## (undated) DEVICE — SUT VICRYL 1 CT-1 27 IN J261H

## (undated) DEVICE — NEURO PATTIES 1/2 X 1

## (undated) DEVICE — ASTOUND STANDARD SURGICAL GOWN, XXL: Brand: CONVERTORS

## (undated) DEVICE — PENCIL ELECTROSURG E-Z CLEAN -0035H

## (undated) DEVICE — DRAPE LAPAROTOMY W/POUCHES

## (undated) DEVICE — PETRI DISH STERILE

## (undated) DEVICE — 2963 MEDIPORE SOFT CLOTH TAPE 3 IN X 10 YD 12 RLS/CS: Brand: 3M™ MEDIPORE™

## (undated) DEVICE — BETHLEHEM UNIVERSAL SPINE, KIT: Brand: CARDINAL HEALTH

## (undated) DEVICE — TOWEL SURG XR DETECT GREEN STRL RFD

## (undated) DEVICE — SCD SEQUENTIAL COMPRESSION COMFORT SLEEVE MEDIUM KNEE LENGTH: Brand: KENDALL SCD

## (undated) DEVICE — GLOVE SRG BIOGEL 8

## (undated) DEVICE — STERI DRAPE 1000 NON-STERILE ROLL

## (undated) DEVICE — GLOVE INDICATOR PI UNDERGLOVE SZ 6.5 BLUE

## (undated) DEVICE — TOOL 14MH30 LEGEND 14CM 3MM: Brand: MIDAS REX ™

## (undated) DEVICE — Device

## (undated) DEVICE — SNAP KOVER: Brand: UNBRANDED

## (undated) DEVICE — SPONGE LAP 18 X 4 IN STRL RFD

## (undated) DEVICE — GLOVE INDICATOR PI UNDERGLOVE SZ 7.5 BLUE

## (undated) DEVICE — FLOSEAL HEMOSTATIC MATRIX, 10 ML: Brand: FLOSEAL

## (undated) DEVICE — 2000CC GUARDIAN II: Brand: GUARDIAN

## (undated) DEVICE — SUT VICRYL 2-0 CT-2 27 IN J269H

## (undated) DEVICE — GAUZE SPONGES,16 PLY: Brand: CURITY

## (undated) DEVICE — NEEDLE HYPO 22G X 1-1/2 IN

## (undated) DEVICE — GLOVE SRG BIOGEL 6.5

## (undated) DEVICE — DRAPE C-ARM X-RAY

## (undated) DEVICE — SYRINGE 10ML LL CONTROL TOP

## (undated) DEVICE — EXIDINE 4 PCT

## (undated) DEVICE — ADHESIVE SKN CLSR HISTOACRYL FLEX 0.5ML LF

## (undated) DEVICE — GLOVE SRG BIOGEL ECLIPSE 7

## (undated) DEVICE — 3M™ STERI-STRIP™ REINFORCED ADHESIVE SKIN CLOSURES, R1547, 1/2 IN X 4 IN (12 MM X 100 MM), 6 STRIPS/ENVELOPE: Brand: 3M™ STERI-STRIP™

## (undated) DEVICE — SUT MONOCRYL 3-0 PS-2 27 IN Y427H

## (undated) DEVICE — SUT VICRYL 1 CTX 36 IN J977H

## (undated) DEVICE — DRAPE EQUIPMENT RF WAND

## (undated) DEVICE — ELECTRODE BLADE MOD  E-Z CLEAN 6.5IN -0014M

## (undated) DEVICE — DRAPE SHEET X-LG